# Patient Record
Sex: MALE | Employment: FULL TIME | URBAN - METROPOLITAN AREA
[De-identification: names, ages, dates, MRNs, and addresses within clinical notes are randomized per-mention and may not be internally consistent; named-entity substitution may affect disease eponyms.]

---

## 2022-08-04 ENCOUNTER — HOSPITAL ENCOUNTER (OUTPATIENT)
Dept: GENERAL RADIOLOGY | Age: 32
Discharge: HOME | End: 2022-08-04
Payer: COMMERCIAL

## 2022-08-04 ENCOUNTER — LAB ONLY (OUTPATIENT)
Dept: FAMILY MEDICINE CLINIC | Age: 32
End: 2022-08-04

## 2022-08-04 ENCOUNTER — OFFICE VISIT (OUTPATIENT)
Dept: FAMILY MEDICINE CLINIC | Age: 32
End: 2022-08-04

## 2022-08-04 VITALS
DIASTOLIC BLOOD PRESSURE: 62 MMHG | WEIGHT: 169 LBS | HEART RATE: 87 BPM | BODY MASS INDEX: 25.03 KG/M2 | TEMPERATURE: 97.8 F | HEIGHT: 69 IN | OXYGEN SATURATION: 98 % | SYSTOLIC BLOOD PRESSURE: 112 MMHG | RESPIRATION RATE: 16 BRPM

## 2022-08-04 DIAGNOSIS — M25.562 ACUTE PAIN OF BOTH KNEES: ICD-10-CM

## 2022-08-04 DIAGNOSIS — R20.2 PARESTHESIA OF BOTH LEGS: ICD-10-CM

## 2022-08-04 DIAGNOSIS — M25.561 ACUTE PAIN OF BOTH KNEES: Primary | ICD-10-CM

## 2022-08-04 DIAGNOSIS — Z13.220 SCREENING FOR LIPID DISORDERS: ICD-10-CM

## 2022-08-04 DIAGNOSIS — M25.561 ACUTE PAIN OF BOTH KNEES: ICD-10-CM

## 2022-08-04 DIAGNOSIS — M25.569 ACUTE KNEE PAIN, UNSPECIFIED LATERALITY: ICD-10-CM

## 2022-08-04 DIAGNOSIS — B36.9 FUNGAL RASH OF TORSO: ICD-10-CM

## 2022-08-04 DIAGNOSIS — M25.562 ACUTE PAIN OF BOTH KNEES: Primary | ICD-10-CM

## 2022-08-04 PROCEDURE — 99203 OFFICE O/P NEW LOW 30 MIN: CPT | Performed by: NURSE PRACTITIONER

## 2022-08-04 PROCEDURE — 99204 OFFICE O/P NEW MOD 45 MIN: CPT | Performed by: NURSE PRACTITIONER

## 2022-08-04 PROCEDURE — 73562 X-RAY EXAM OF KNEE 3: CPT

## 2022-08-04 RX ORDER — KETOCONAZOLE 20 MG/G
CREAM TOPICAL 2 TIMES DAILY
Qty: 60 G | Refills: 0 | Status: SHIPPED | OUTPATIENT
Start: 2022-08-04

## 2022-08-04 NOTE — PROGRESS NOTES
Identified pt with two pt identifiers(name and ). Chief Complaint   Patient presents with    New Patient    Rash     On neck has been getting worse over the last year    Knee Pain        Health Maintenance Due   Topic    Hepatitis C Screening     Depression Screen     COVID-19 Vaccine (1)    DTaP/Tdap/Td series (1 - Tdap)       Wt Readings from Last 3 Encounters:   22 169 lb (76.7 kg)     Temp Readings from Last 3 Encounters:   22 97.8 °F (36.6 °C) (Temporal)     BP Readings from Last 3 Encounters:   22 112/62     Pulse Readings from Last 3 Encounters:   22 87         Learning Assessment:  :     No flowsheet data found. Depression Screening:  :     3 most recent PHQ Screens 2022   Little interest or pleasure in doing things Not at all   Feeling down, depressed, irritable, or hopeless Not at all   Total Score PHQ 2 0       Fall Risk Assessment:  :     No flowsheet data found. Abuse Screening:  :     Abuse Screening Questionnaire 2022   Do you ever feel afraid of your partner? N   Are you in a relationship with someone who physically or mentally threatens you? N   Is it safe for you to go home? Y       Coordination of Care Questionnaire:  :     1) Have you been to an emergency room, urgent care clinic since your last visit? no   Hospitalized since your last visit? no             2) Have you seen or consulted any other health care providers outside of [address] [place] [address] since your last visit? no  (Include any pap smears or colon screenings in this section.)    3) Do you have an Advance Directive on file? no  Are you interested in receiving information about Advance Directives? no    Patient is accompanied by N/A I have received verbal consent from Eneida Moulton to discuss any/all medical information while they are present in the room. 4.  For patients aged 39-70: Has the patient had a colonoscopy / FIT/ Cologuard?  NA - based on age      If the patient is female:    5. For patients aged 41-77: Has the patient had a mammogram within the past 2 years? NA - based on age or sex      10. For patients aged 21-65: Has the patient had a pap smear?  NA - based on age or sex

## 2022-08-04 NOTE — PROGRESS NOTES
HISTORY OF PRESENT ILLNESS  Eneida Escamilla is a 33 y.o. male. HPI  He is here to establish care. Has a few concerns. Rash-Last year started with rash on neck. It has now spread to upper chest. Patient states that it itches every now and then. Has not been putting anything on it. Knee Pain-He also reports right lateral knee pain when in the sitting on floor and legs crossed. Over the past two weeks he has bilateral knee/patellar pain with a two day episode of bilateral paresthesia. He reports no trauma, erythema, or swelling. Has not taken anything for the pain or the rash. Of note his father has diabetes. Physical Exam  General No acute distress. Alert. Cooperative. Head: Normocephalic. Atraumatic. Neck: Supple. Normal ROM. Respiratory: Clear to auscultation, normal excursion, no cough  Cardiovascular: RRR. Normal S1,S2. No m/r/g. Pulses 2+ throughout. Skin: Warm, dry. Multiple erythematous papules on the neck and chest in various sizes  MSK: normal gait, negative for bilateral knee swelling, normal ROM, 5/5 strength  Neuro: A&Ox 3.   ASSESSMENT and PLAN    ICD-10-CM ICD-9-CM    1. Acute pain of both knees  M25.561 338.19 XR KNEE LT MAX 2 VWS    M25.562 719.46 XR KNEE RT MAX 2 VWS      2. Fungal rash of torso  B36.9 111.9 CBC W/O DIFF      HEMOGLOBIN A1C WITH EAG      ketoconazole (NIZORAL) 2 % topical cream      3. Screening for lipid disorders  Z13.220 V77.91 LIPID PANEL      4. Acute knee pain, unspecified laterality  M25.569 719.46       5.  Paresthesia of both legs  R20.2 782.0 CBC W/O DIFF      METABOLIC PANEL, COMPREHENSIVE      HEMOGLOBIN A1C WITH EAG        RTC prn

## 2022-08-05 ENCOUNTER — TELEPHONE (OUTPATIENT)
Dept: FAMILY MEDICINE CLINIC | Age: 32
End: 2022-08-05

## 2022-08-05 LAB
ALBUMIN SERPL-MCNC: 4.3 G/DL (ref 3.5–5)
ALBUMIN/GLOB SERPL: 1.4 {RATIO} (ref 1.1–2.2)
ALP SERPL-CCNC: 51 U/L (ref 45–117)
ALT SERPL-CCNC: 37 U/L (ref 12–78)
ANION GAP SERPL CALC-SCNC: 4 MMOL/L (ref 5–15)
AST SERPL-CCNC: 23 U/L (ref 15–37)
BILIRUB SERPL-MCNC: 0.4 MG/DL (ref 0.2–1)
BUN SERPL-MCNC: 18 MG/DL (ref 6–20)
BUN/CREAT SERPL: 17 (ref 12–20)
CALCIUM SERPL-MCNC: 9.7 MG/DL (ref 8.5–10.1)
CHLORIDE SERPL-SCNC: 108 MMOL/L (ref 97–108)
CHOLEST SERPL-MCNC: 113 MG/DL
CO2 SERPL-SCNC: 29 MMOL/L (ref 21–32)
CREAT SERPL-MCNC: 1.07 MG/DL (ref 0.7–1.3)
ERYTHROCYTE [DISTWIDTH] IN BLOOD BY AUTOMATED COUNT: 12.7 % (ref 11.5–14.5)
EST. AVERAGE GLUCOSE BLD GHB EST-MCNC: 108 MG/DL
GLOBULIN SER CALC-MCNC: 3.1 G/DL (ref 2–4)
GLUCOSE SERPL-MCNC: 110 MG/DL (ref 65–100)
HBA1C MFR BLD: 5.4 % (ref 4–5.6)
HCT VFR BLD AUTO: 45.6 % (ref 36.6–50.3)
HDLC SERPL-MCNC: 41 MG/DL
HDLC SERPL: 2.8 {RATIO} (ref 0–5)
HGB BLD-MCNC: 15.2 G/DL (ref 12.1–17)
LDLC SERPL CALC-MCNC: 60.4 MG/DL (ref 0–100)
MCH RBC QN AUTO: 30.4 PG (ref 26–34)
MCHC RBC AUTO-ENTMCNC: 33.3 G/DL (ref 30–36.5)
MCV RBC AUTO: 91.2 FL (ref 80–99)
NRBC # BLD: 0 K/UL (ref 0–0.01)
NRBC BLD-RTO: 0 PER 100 WBC
PLATELET # BLD AUTO: 289 K/UL (ref 150–400)
PMV BLD AUTO: 11.9 FL (ref 8.9–12.9)
POTASSIUM SERPL-SCNC: 4.5 MMOL/L (ref 3.5–5.1)
PROT SERPL-MCNC: 7.4 G/DL (ref 6.4–8.2)
RBC # BLD AUTO: 5 M/UL (ref 4.1–5.7)
SODIUM SERPL-SCNC: 141 MMOL/L (ref 136–145)
TRIGL SERPL-MCNC: 58 MG/DL (ref ?–150)
VLDLC SERPL CALC-MCNC: 11.6 MG/DL
WBC # BLD AUTO: 6 K/UL (ref 4.1–11.1)

## 2022-08-05 NOTE — TELEPHONE ENCOUNTER
----- Message from Eileen Liz NP sent at 8/5/2022  7:55 AM EDT -----    Please call patient and let him know that his labs returned stable. This is reassuring  Thanks    Please call patient and let him know that his x-rays returned stable. This isreassuring.   Should pain persist, could refer to PT  Thanks

## 2022-08-05 NOTE — TELEPHONE ENCOUNTER
----- Message from Leo Suh NP sent at 8/5/2022  7:54 AM EDT -----  Please call patient and let him know that his x-rays returned stable. This is reassuring.   Should pain persist, could refer to PT  Thanks

## 2022-08-05 NOTE — PROGRESS NOTES
Please call patient and let him know that his x-rays returned stable. This is reassuring.   Should pain persist, could refer to PT  Thanks

## 2022-08-05 NOTE — TELEPHONE ENCOUNTER
Pt returning nurse call from this morning and  read the xray results but pt seemed confused and stated that was not for him?     Call pt at [phone]